# Patient Record
Sex: FEMALE | Race: WHITE
[De-identification: names, ages, dates, MRNs, and addresses within clinical notes are randomized per-mention and may not be internally consistent; named-entity substitution may affect disease eponyms.]

---

## 2018-05-15 ENCOUNTER — HOSPITAL ENCOUNTER (EMERGENCY)
Dept: HOSPITAL 74 - JERFT | Age: 28
Discharge: HOME | End: 2018-05-15
Payer: SELF-PAY

## 2018-05-15 VITALS — SYSTOLIC BLOOD PRESSURE: 122 MMHG | TEMPERATURE: 98.4 F | DIASTOLIC BLOOD PRESSURE: 77 MMHG | HEART RATE: 71 BPM

## 2018-05-15 VITALS — BODY MASS INDEX: 19 KG/M2

## 2018-05-15 DIAGNOSIS — J30.2: Primary | ICD-10-CM

## 2018-05-15 NOTE — PDOC
History of Present Illness





- General


Chief Complaint: Respiratory


Stated Complaint: COLD SYMPTOMS


Time Seen by Provider: 05/15/18 19:35





- History of Present Illness


Initial Comments: 


27-year-old female presents for evaluation of nasal congestion and pressure 3 

weeks. She denies any fever chills or night sweats. She has no past medical 

history no surgical history no ALLERGIES to medications.


05/15/18 21:20








Past History





- Past Medical History


Allergies/Adverse Reactions: 


 Allergies











Allergy/AdvReac Type Severity Reaction Status Date / Time


 


No Known Allergies Allergy   Verified 05/15/18 19:40











Home Medications: 


Ambulatory Orders





Budesonide [Rhinocort Allergy] 1 spray NS DAILY #1 spray.pump 05/15/18 


Fexofenadine/Pseudoephedrine [Allegra-D 24 Hour Tablet] 1 each PO DAILY #30 

tab.er.[email or web address] 05/15/18 








COPD: No


Other medical history: Pt denies





- Suicide/Smoking/Psychosocial Hx


Smoking History: Never smoked


Have you smoked in the past 12 months: No


Information on smoking cessation initiated: No


Hx Alcohol Use: No


Drug/Substance Use Hx: No


Substance Use Type: None





**Review of Systems





- Review of Systems


HEENTM: Yes: Nose Congestion


All Other Systems: Reviewed and Negative





*Physical Exam





- Vital Signs


 Last Vital Signs











Temp Pulse Resp BP Pulse Ox


 


 98.4 F   71   18   122/77   100 


 


 05/15/18 19:40  05/15/18 19:40  05/15/18 19:40  05/15/18 19:40  05/15/18 19:40














- Physical Exam


Comments: 


GENERAL: [The patient is awake, alert, and fully oriented, in no acute distress.

]


HEAD: [Normal with no signs of trauma.]


EYES: [Pupils equal, round and reactive to light, extraocular movements intact, 

sclera anicteric, conjunctiva clear.]


ENT: [Ears normal, nares congested, oropharynx clear without exudates.  Moist 

mucous membranes.]


NECK: [Normal range of motion, supple without lymphadenopathy, JVD, or masses.]


LUNGS: [Breath sounds equal, clear to auscultation bilaterally.  No wheezes, 

and no crackles.]


HEART: [Regular rate and rhythm, normal S1 and S2 without murmur, rub or gallop.

]


ABDOMEN: [Soft, nontender, normoactive bowel sounds.  No guarding, no rebound.  

No masses.]


EXTREMITIES: [Normal range of motion, no edema.  No clubbing or cyanosis. No 

cords, erythema, or tenderness.]


NEUROLOGICAL: [Cranial nerves II through XII grossly intact.  Normal speech, 

normal gait.]


PSYCH: [Normal mood, normal affect.]


SKIN: [Warm, Dry, normal turgor, no rashes or lesions noted.]


05/15/18 21:21








ED Treatment Course





- ADDITIONAL ORDERS


Additional order review: 


 Laboratory  Results











  05/15/18





  20:40


 


Urine HCG, Qual  Negative














Medical Decision Making





- Medical Decision Making


77-year-old female with seasonal ALLERGIES prescribed. Antihistamine with 

decongestant and nasal spray


05/15/18 21:24








*DC/Admit/Observation/Transfer


Diagnosis at time of Disposition: 


 Seasonal allergic rhinitis








- Discharge Dispostion


Disposition: HOME


Condition at time of disposition: Stable


Decision to Admit order: No





- Referrals


Referrals: 


Kranthi Miller MD [Staff Physician] - 





- Patient Instructions


Additional Instructions: 


Given you prescriptions for nasal spray and an ALLERGY medicine. It's one pill 

a day and one spray in each nostril per day. Return to the emergency room since 

her symptoms worsen or go unresolved prior to follow-up with the primary care 

physician I recommended for U with the next 2-3 days.





- Post Discharge Activity

## 2018-05-15 NOTE — PDOC
Rapid Medical Evaluation


Time Seen by Provider: 05/15/18 19:35


Medical Evaluation: 





05/15/18 19:36


I have performed a brief in-person evaluation of this patient. 





The patient presents with a chief complaint of:


coughing, headache and sneezing x 3 weeks








Pertinent physical exam findings:


Nad


stuffy nose, itching nose


occasional coughing in triage


lungs clear bilaterally





I have ordered the following:


urine hcg


The patient will proceed to the ED for further evaluation.